# Patient Record
Sex: FEMALE | Race: BLACK OR AFRICAN AMERICAN | NOT HISPANIC OR LATINO | Employment: PART TIME | URBAN - METROPOLITAN AREA
[De-identification: names, ages, dates, MRNs, and addresses within clinical notes are randomized per-mention and may not be internally consistent; named-entity substitution may affect disease eponyms.]

---
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
## 2023-07-26 PROBLEM — E05.00 GRAVES' DISEASE: Status: ACTIVE | Noted: 2023-07-26

## 2024-05-26 ENCOUNTER — OFFICE VISIT (OUTPATIENT)
Dept: URGENT CARE | Facility: CLINIC | Age: 19
End: 2024-05-26
Payer: MEDICAID

## 2024-05-26 VITALS
WEIGHT: 123 LBS | OXYGEN SATURATION: 98 % | BODY MASS INDEX: 26.54 KG/M2 | SYSTOLIC BLOOD PRESSURE: 116 MMHG | DIASTOLIC BLOOD PRESSURE: 79 MMHG | HEART RATE: 82 BPM | HEIGHT: 57 IN | TEMPERATURE: 98 F | RESPIRATION RATE: 18 BRPM

## 2024-05-26 DIAGNOSIS — J02.9 SORE THROAT: Primary | ICD-10-CM

## 2024-05-26 DIAGNOSIS — J01.40 ACUTE PANSINUSITIS, RECURRENCE NOT SPECIFIED: ICD-10-CM

## 2024-05-26 LAB
CTP QC/QA: YES
MOLECULAR STREP A: NEGATIVE

## 2024-05-26 PROCEDURE — 99214 OFFICE O/P EST MOD 30 MIN: CPT | Mod: S$GLB,,, | Performed by: NURSE PRACTITIONER

## 2024-05-26 PROCEDURE — 87651 STREP A DNA AMP PROBE: CPT | Mod: QW,S$GLB,, | Performed by: NURSE PRACTITIONER

## 2024-05-26 RX ORDER — AZITHROMYCIN 250 MG/1
TABLET, FILM COATED ORAL
Qty: 6 TABLET | Refills: 0 | Status: SHIPPED | OUTPATIENT
Start: 2024-05-26 | End: 2024-05-31

## 2024-05-26 RX ORDER — FLUTICASONE PROPIONATE 50 MCG
1 SPRAY, SUSPENSION (ML) NASAL DAILY
Qty: 15.8 ML | Refills: 0 | Status: SHIPPED | OUTPATIENT
Start: 2024-05-26 | End: 2024-06-02

## 2024-05-26 NOTE — LETTER
May 26, 2024      [facility]  [address]  [place] [address]  Phone: [phone]  Fax: [phone]       Patient: Torsten Kevin   YOB: 2005  Date of Visit: 05/26/2024    To Whom It May Concern:    Jamey Kevin  was at [facility] on 05/26/2024. The patient may return to work/school on 5/27/2024 with no restrictions. If you have any questions or concerns, or if I can be of further assistance, please do not hesitate to contact me.    Sincerely,    Kelly Gordon, NP

## 2024-05-26 NOTE — PROGRESS NOTES
"Subjective:      Patient ID: Torsten Kevin is a 18 y.o. female.    Vitals:  height is 4' 9" (1.448 m) and weight is 55.8 kg (123 lb). Her oral temperature is 97.6 °F (36.4 °C). Her blood pressure is 116/79 and her pulse is 82. Her respiration is 18 and oxygen saturation is 98%.     Chief Complaint: Sore Throat    Sore Throat   This is a new problem. The current episode started yesterday. The problem has been gradually improving. The maximum temperature recorded prior to her arrival was 100.4 - 100.9 F. The pain is at a severity of 0/10. The patient is experiencing no pain. Associated symptoms include congestion and coughing. Pertinent negatives include no abdominal pain, diarrhea, ear pain, headaches or vomiting. Treatments tried: ibuprofen. The treatment provided significant relief.       HENT:  Positive for congestion and sore throat. Negative for ear pain.    Respiratory:  Positive for cough.    Gastrointestinal:  Negative for abdominal pain, vomiting and diarrhea.   Neurological:  Negative for headaches.      Objective:     Physical Exam   Constitutional: She is oriented to person, place, and time. normal  HENT:   Head: Normocephalic.   Ears:   Right Ear: Tympanic membrane normal.   Left Ear: Tympanic membrane normal.   Nose: Congestion present. Right sinus exhibits maxillary sinus tenderness. Left sinus exhibits maxillary sinus tenderness.   Mouth/Throat: Mucous membranes are moist. Oropharynx is clear.   Cardiovascular: Normal rate.   Pulmonary/Chest: Effort normal and breath sounds normal.   Abdominal: Normal appearance.   Neurological: no focal deficit. She is alert, oriented to person, place, and time and at baseline.   Skin: Skin is warm. Capillary refill takes less than 2 seconds.   Nursing note and vitals reviewed.    Assessment:     1. Sore throat    2. Acute pansinusitis, recurrence not specified        Plan:       Sore throat  -     POCT Strep A, Molecular    Acute pansinusitis, recurrence not " specified    Other orders  -     fluticasone propionate (FLONASE) 50 mcg/actuation nasal spray; 1 spray (50 mcg total) by Each Nostril route once daily. for 7 days  Dispense: 15.8 mL; Refill: 0  -     azithromycin (Z-AMBERLY) 250 MG tablet; Take 2 tablets by mouth on day 1; Take 1 tablet by mouth on days 2-5  Dispense: 6 tablet; Refill: 0

## 2024-05-26 NOTE — LETTER
May 26, 2024      [facility]  [address], [facility] [address]  [place] [address]  Phone: [phone]  Fax: [phone]       Patient: Torsten Kevin   YOB: 2005  Date of Visit: 05/26/2024    To Whom It May Concern:    Jamey Kevin  was at [facility] on 05/26/2024. The patient may return to work/school on 5/28/2024 with no restrictions. If you have any questions or concerns, or if I can be of further assistance, please do not hesitate to contact me.    Sincerely,    Kelly Gordon, NP